# Patient Record
Sex: MALE | Race: WHITE | NOT HISPANIC OR LATINO | Employment: FULL TIME | URBAN - NONMETROPOLITAN AREA
[De-identification: names, ages, dates, MRNs, and addresses within clinical notes are randomized per-mention and may not be internally consistent; named-entity substitution may affect disease eponyms.]

---

## 2017-07-12 ENCOUNTER — OFFICE VISIT (OUTPATIENT)
Dept: CARDIOLOGY | Facility: CLINIC | Age: 62
End: 2017-07-12

## 2017-07-12 VITALS
SYSTOLIC BLOOD PRESSURE: 140 MMHG | DIASTOLIC BLOOD PRESSURE: 89 MMHG | OXYGEN SATURATION: 95 % | HEIGHT: 72 IN | BODY MASS INDEX: 37.82 KG/M2 | WEIGHT: 279.2 LBS | HEART RATE: 78 BPM

## 2017-07-12 DIAGNOSIS — R53.82 CHRONIC FATIGUE: ICD-10-CM

## 2017-07-12 DIAGNOSIS — Z82.49 FAMILY HISTORY OF CORONARY ARTERY DISEASE: ICD-10-CM

## 2017-07-12 DIAGNOSIS — R06.02 SOB (SHORTNESS OF BREATH) ON EXERTION: Primary | ICD-10-CM

## 2017-07-12 DIAGNOSIS — R60.0 LOCALIZED EDEMA: ICD-10-CM

## 2017-07-12 DIAGNOSIS — R06.02 SHORTNESS OF BREATH: ICD-10-CM

## 2017-07-12 PROBLEM — R53.83 FATIGUE: Status: ACTIVE | Noted: 2017-07-12

## 2017-07-12 PROBLEM — R60.9 EDEMA: Status: ACTIVE | Noted: 2017-07-12

## 2017-07-12 PROBLEM — M19.90 ARTHRITIS: Status: ACTIVE | Noted: 2017-07-12

## 2017-07-12 PROCEDURE — 99204 OFFICE O/P NEW MOD 45 MIN: CPT | Performed by: INTERNAL MEDICINE

## 2017-07-12 PROCEDURE — 93000 ELECTROCARDIOGRAM COMPLETE: CPT | Performed by: INTERNAL MEDICINE

## 2017-07-12 RX ORDER — POTASSIUM CHLORIDE 750 MG/1
10 CAPSULE, EXTENDED RELEASE ORAL DAILY
COMMUNITY
Start: 2017-07-05

## 2017-07-12 RX ORDER — FUROSEMIDE 20 MG/1
20 TABLET ORAL DAILY
COMMUNITY
Start: 2017-07-05

## 2017-07-12 RX ORDER — ALPRAZOLAM 0.5 MG/1
0.5 TABLET ORAL 2 TIMES DAILY
COMMUNITY
Start: 2017-07-07

## 2017-07-12 RX ORDER — NAPROXEN 500 MG/1
500 TABLET ORAL 2 TIMES DAILY
COMMUNITY
Start: 2017-07-05

## 2017-07-12 RX ORDER — OMEPRAZOLE 20 MG/1
20 CAPSULE, DELAYED RELEASE ORAL DAILY
COMMUNITY
Start: 2017-07-05

## 2017-07-12 NOTE — PROGRESS NOTES
"Subjective   Baron Bradford is a 62 y.o. male     Chief Complaint   Patient presents with   • Establish Care     patient appears in office today to est cardiac care   • Leg Swelling     patient states he is experiencing increase in BLE edema/swelling       PROBLEM LIST:     1. Family history of CAD    Specialty Problems     None            HPI:  Mr. Baron Bradford is a 62-year-old white male referred by Dr. Farrukh Daly for evaluation of lower extremity edema.    Mr. Bradford first noted swelling in his ankles approximately one year ago.  This seems to have been gradually progressive over time but, on specific questioning, the patient relates that symptoms worsened when he had bilateral knee braces placed.  He is very physically limited by profound degenerative arthritis in both hips and both knees.  He states that all 4 joints need to be replaced.  Mr. 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  He feels it is excised capacity may have been decreased somewhat as he has been less physically active over the summer.    Mr. Bradford denies any type of chest discomfort.  He relates no orthopnea or PND.  He never palpitates, he has not been dizzy, presyncopal, or syncopal.  He has no known liver or renal disease.  He describes no claudication or symptoms of cerebral ischemia.  He has no other symptoms of peripheral arterial disease or of arterial embolic events.  Although Mr. Bradford describes occasional \"panic attacks\", he has never noted palpitations in association with these episodes.          CURRENT MEDICATION:    Current Outpatient Prescriptions   Medication Sig Dispense Refill   • ALPRAZolam (XANAX) 0.5 MG tablet 0.5 mg 2 (Two) Times a Day.     • furosemide (LASIX) 20 MG tablet 20 mg Daily.     • naproxen (NAPROSYN) 500 MG tablet 500 mg 2 (Two) Times a Day.     • omeprazole (priLOSEC) 20 MG capsule 20 mg Daily.     • " potassium chloride (MICRO-K) 10 MEQ CR capsule 10 mEq Daily.       No current facility-administered medications for this visit.        ALLERGIES:    Review of patient's allergies indicates no known allergies.    PAST MEDICAL HISTORY:    Past Medical History:   Diagnosis Date   • Anxiety    • Depression    • Edema    • Osteoarthritis        SURGICAL HISTORY:    Past Surgical History:   Procedure Laterality Date   • COLONOSCOPY W/ BIOPSIES     • ESOPHAGOSCOPY / EGD         SOCIAL HISTORY:    Social History     Social History   • Marital status:      Spouse name: N/A   • Number of children: N/A   • Years of education: N/A     Occupational History   • Not on file.     Social History Main Topics   • Smoking status: Never Smoker   • Smokeless tobacco: Never Used   • Alcohol use No   • Drug use: No   • Sexual activity: Defer     Other Topics Concern   • Not on file     Social History Narrative   • No narrative on file       FAMILY HISTORY:    Family History   Problem Relation Age of Onset   • COPD Mother    • Heart disease Mother    • Hypertension Mother    • Diabetes Mother    • Heart disease Father    • COPD Father    • Hypertension Father    • Diabetes Father        Review of Systems   Constitutional: Positive for fatigue (increase in fatigue).   HENT: Negative.    Eyes: Positive for visual disturbance (wears glasses daily).   Respiratory: Positive for shortness of breath (on exertion). Negative for cough, chest tightness and wheezing.    Cardiovascular: Positive for leg swelling (BLE swelling/edema, worsening since wearing leg braces BL). Negative for chest pain and palpitations.   Gastrointestinal: Positive for constipation (occasionally, not chronic). Negative for abdominal pain, blood in stool (no melena, no hematuria, no hematemesis, rare hematochezia due to constipation ), diarrhea, nausea and vomiting.   Endocrine: Positive for cold intolerance. Negative for heat intolerance, polyphagia and polyuria.  "  Genitourinary: Negative.  Negative for difficulty urinating, frequency and urgency.   Musculoskeletal: Positive for arthralgias (H/O OA, chronic hip and knee pian) and gait problem (ambulates with aid of cane). Negative for back pain, joint swelling, myalgias, neck pain and neck stiffness.   Skin: Negative.  Negative for rash and wound.   Allergic/Immunologic: Positive for environmental allergies (seasonal allergies). Negative for food allergies.   Neurological: Negative for dizziness, tremors, seizures, syncope, facial asymmetry (no stroke like symptoms ), speech difficulty, weakness, light-headedness and headaches.   Hematological: Negative.  Does not bruise/bleed easily.   Psychiatric/Behavioral: Negative for agitation, confusion and sleep disturbance. The patient is nervous/anxious.         Depression          VISIT VITALS:    /89 (BP Location: Left arm, Patient Position: Sitting)  Pulse 78  Ht 72\" (182.9 cm)  Wt 279 lb 3.2 oz (127 kg)  SpO2 95%  BMI 37.87 kg/m2    RECENT LABS:    Objective       Physical Exam   Constitutional: He is oriented to person, place, and time. He appears well-developed and well-nourished. No distress.   HENT:   Head: Normocephalic and atraumatic.   Eyes: Conjunctivae, EOM and lids are normal. Pupils are equal, round, and reactive to light. Lids are everted and swept, no foreign bodies found.   Negative ptosis   Negative lid lag  Negative arcus senilis      Neck: Normal range of motion. Neck supple. Normal carotid pulses, no hepatojugular reflux and no JVD present. Carotid bruit is not present (normal carotid uptrokes ). No thyroid mass and no thyromegaly present.   Cardiovascular: Normal rate, regular rhythm, S1 normal, S2 normal, normal heart sounds, intact distal pulses and normal pulses.   No extrasystoles are present. Exam reveals no gallop, no S3, no S4, no distant heart sounds, no friction rub and no decreased pulses.    No murmur heard.   No systolic murmur is " present    No diastolic murmur is present   Pulmonary/Chest: Effort normal and breath sounds normal. No respiratory distress. He has no decreased breath sounds. He has no wheezes. He has no rhonchi. He has no rales. He exhibits no tenderness.   Abdominal: Soft. Bowel sounds are normal. He exhibits no distension, no abdominal bruit and no mass. There is no tenderness.   Negative organomegaly      Musculoskeletal: He exhibits edema. He exhibits no tenderness or deformity.   Lymphadenopathy:     He has no cervical adenopathy.     He has no axillary adenopathy.   Neurological: He is alert and oriented to person, place, and time. He has normal reflexes.   Skin: Skin is warm and dry. No rash noted. He is not diaphoretic. No erythema. No pallor.   Psychiatric: He has a normal mood and affect. His speech is normal and behavior is normal. Judgment and thought content normal. Cognition and memory are normal.   Nursing note and vitals reviewed.        ECG 12 Lead  Date/Time: 7/12/2017 2:35 PM  Performed by: DEEPA PARRA  Authorized by: DEEPA PARRA   Rhythm: sinus rhythm  Clinical impression: normal ECG  Comments: SOB              Assessment/Plan    #1.  I think that Mr. Yeager lower extremity edema is multifactorial with a significant contributing factor eating is limited lower extremity mobility, and physical constraint from his knee braces.  However, I think that secondary causes need to be excluded.    #2.  Therefore, we will perform a complete met               Diagnosis Plan   1. SOB (shortness of breath) on exertion  ECG 12 Lead    Adult Transthoracic Echo Complete    Comprehensive Metabolic Panel   2. Localized edema  Adult Transthoracic Echo Complete    Comprehensive Metabolic Panel   3. Chronic fatigue  Adult Transthoracic Echo Complete    Comprehensive Metabolic Panel   4. Family history of coronary artery disease  Adult Transthoracic Echo Complete    Comprehensive Metabolic Panel       Return for f/u  after echo and labs.         Shaun Greenwood, Lalybolic panel to assess renal function as well as to look at to the protein and albumin.  We will check thyroid hormones.    #3.  I would like to perform echocardiography to assess LV systolic and diastolic function LV filling pressures and pulmonary pressures.    #4.  The above workup is unremarkable I think would be reasonable to assume that the majority of his lower extremity edema is mechanical and treat accordingly.  If significant pathology has demonstrated any of the above we will pursue that is indicated.    #5.  Mr. Bradford will follow-up with Dr. Daly as instructed, and in our office after testing) a when necessary basis as discussed.  Because of the patient's poor physical capacity, were he to consider joint replacement, I would recommend pharmacologic stress testing preoperatively.

## 2017-07-31 ENCOUNTER — HOSPITAL ENCOUNTER (OUTPATIENT)
Dept: CARDIOLOGY | Facility: HOSPITAL | Age: 62
Discharge: HOME | End: 2017-07-31
Attending: INTERNAL MEDICINE | Admitting: INTERNAL MEDICINE

## 2017-07-31 ENCOUNTER — OUTSIDE FACILITY SERVICE (OUTPATIENT)
Dept: CARDIOLOGY | Facility: CLINIC | Age: 62
End: 2017-07-31

## 2017-07-31 PROCEDURE — 93306 TTE W/DOPPLER COMPLETE: CPT

## 2017-07-31 PROCEDURE — 93306 TTE W/DOPPLER COMPLETE: CPT | Performed by: INTERNAL MEDICINE

## 2017-08-08 ENCOUNTER — DOCUMENTATION (OUTPATIENT)
Dept: CARDIOLOGY | Facility: CLINIC | Age: 62
End: 2017-08-08

## 2017-12-28 ENCOUNTER — OFFICE VISIT (OUTPATIENT)
Dept: CARDIOLOGY | Facility: CLINIC | Age: 62
End: 2017-12-28

## 2017-12-28 VITALS
DIASTOLIC BLOOD PRESSURE: 96 MMHG | HEIGHT: 72 IN | HEART RATE: 88 BPM | BODY MASS INDEX: 39.31 KG/M2 | SYSTOLIC BLOOD PRESSURE: 155 MMHG | WEIGHT: 290.2 LBS | OXYGEN SATURATION: 95 %

## 2017-12-28 DIAGNOSIS — I10 ESSENTIAL HYPERTENSION: ICD-10-CM

## 2017-12-28 DIAGNOSIS — R06.02 SHORTNESS OF BREATH: Primary | ICD-10-CM

## 2017-12-28 DIAGNOSIS — R60.0 BILATERAL LOWER EXTREMITY EDEMA: ICD-10-CM

## 2017-12-28 PROCEDURE — 99213 OFFICE O/P EST LOW 20 MIN: CPT | Performed by: PHYSICIAN ASSISTANT

## 2017-12-28 RX ORDER — AMOXICILLIN 500 MG/1
500 CAPSULE ORAL 3 TIMES DAILY
COMMUNITY
Start: 2017-12-26 | End: 2018-08-06

## 2017-12-28 RX ORDER — ALBUTEROL SULFATE 90 UG/1
2 AEROSOL, METERED RESPIRATORY (INHALATION)
COMMUNITY
Start: 2017-12-26

## 2017-12-28 NOTE — PROGRESS NOTES
Problem list     Subjective   Baron Bradford is a 62 y.o. male     Chief Complaint   Patient presents with   • Results     Presents for 3 month Echo follow up.    • Shortness of Breath       HPI    Patient is a 62-year-old male that presents back to follow-up on echocardiogram.  He was seen initially approximately in July because of lower extremity edema to rule out a cardiac source.  Echocardiogram suggests normal systolic function, mild diastolic dysfunction, no significant valve lesions, no pulmonary hypertension and otherwise unremarkable.    Patient is on diuretics for lower extremity edema.  He complains much today about osteoarthritis in bilateral knees and hips.  Patient is withholding from any surgery at this time.    He does not describe any chest pain or pressure.  Dyspnea is mild at baseline but nothing progressive.  Denies PND orthopnea.  He doesn't palpitate or dysrhythmic symptoms and otherwise is doing well      Outpatient Encounter Prescriptions as of 12/28/2017   Medication Sig Dispense Refill   • ALPRAZolam (XANAX) 0.5 MG tablet 0.5 mg 2 (Two) Times a Day.     • amoxicillin (AMOXIL) 500 MG capsule Take 500 mg by mouth 3 (Three) Times a Day.     • furosemide (LASIX) 20 MG tablet 20 mg Daily.     • naproxen (NAPROSYN) 500 MG tablet 500 mg 2 (Two) Times a Day.     • omeprazole (priLOSEC) 20 MG capsule 20 mg Daily.     • potassium chloride (MICRO-K) 10 MEQ CR capsule 10 mEq Daily.     • VENTOLIN  (90 Base) MCG/ACT inhaler Inhale 2 puffs 4 (Four) Times a Day.       No facility-administered encounter medications on file as of 12/28/2017.        Review of patient's allergies indicates no known allergies.    Past Medical History:   Diagnosis Date   • Anxiety    • Depression    • Edema    • Osteoarthritis        Social History     Social History   • Marital status:      Spouse name: N/A   • Number of children: N/A   • Years of education: N/A     Occupational History   • Not on file.     Social  "History Main Topics   • Smoking status: Never Smoker   • Smokeless tobacco: Never Used   • Alcohol use No   • Drug use: No   • Sexual activity: Defer     Other Topics Concern   • Not on file     Social History Narrative       Family History   Problem Relation Age of Onset   • COPD Mother    • Heart disease Mother    • Hypertension Mother    • Diabetes Mother    • Heart disease Father    • COPD Father    • Hypertension Father    • Diabetes Father        Review of Systems   Constitutional: Positive for fatigue. Negative for chills and fever.   HENT: Positive for congestion (Chest and sinus congestion. ).    Eyes: Positive for visual disturbance (Wears reading glasses).   Respiratory: Positive for cough and shortness of breath (Diagnosed with URI yesterday per PCP.).    Cardiovascular: Negative for chest pain, palpitations and leg swelling.   Gastrointestinal: Negative.    Endocrine: Negative.    Genitourinary: Negative.    Musculoskeletal: Positive for arthralgias (Chronic) and gait problem (Braces to young. knees and ambulates with aid of cane).   Skin: Negative.    Allergic/Immunologic: Negative.    Neurological: Negative for dizziness, syncope, light-headedness, numbness and headaches.   Hematological: Negative.    Psychiatric/Behavioral: Negative.        Objective   Vitals:    12/28/17 0901   BP: 155/96   BP Location: Left arm   Patient Position: Sitting   Pulse: 88   SpO2: 95%   Weight: 132 kg (290 lb 3.2 oz)   Height: 182.9 cm (72\")      /96 (BP Location: Left arm, Patient Position: Sitting)  Pulse 88  Ht 182.9 cm (72\")  Wt 132 kg (290 lb 3.2 oz)  SpO2 95%  BMI 39.36 kg/m2    Lab Results (most recent)     None          Physical Exam   Constitutional: He is oriented to person, place, and time. He appears well-developed and well-nourished. No distress.   HENT:   Head: Normocephalic and atraumatic.   Eyes: EOM are normal. Pupils are equal, round, and reactive to light.   Neck: No JVD present. "   Cardiovascular: Normal rate, regular rhythm, normal heart sounds and intact distal pulses.  Exam reveals no gallop and no friction rub.    No murmur heard.  Pulmonary/Chest: Effort normal and breath sounds normal. No respiratory distress. He has no wheezes. He has no rales. He exhibits no tenderness.   Musculoskeletal: Normal range of motion. He exhibits edema.   Neurological: He is alert and oriented to person, place, and time. No cranial nerve deficit.   Skin: Skin is warm and dry. No rash noted. No erythema. No pallor.   Psychiatric: He has a normal mood and affect. His behavior is normal.   Nursing note and vitals reviewed.      Procedure   Procedures       Assessment/Plan     Problems Addressed this Visit        Cardiovascular and Mediastinum    Essential hypertension       Respiratory    Shortness of breath - Primary       Other    Bilateral lower extremity edema            Recommendation  1.  Cardiac workup was benign and patient has no symptoms to suggest angina failure or dysrhythmia.  2.  Lower extremity edema likely mechanical as well as a degree of venous insufficiency.  We'll continue diuretic therapy  3.  We'll see him back follow-up in 6 months to year.  Follow-up primary as scheduled         Electronically signed by:

## 2018-08-06 ENCOUNTER — OFFICE VISIT (OUTPATIENT)
Dept: CARDIOLOGY | Facility: CLINIC | Age: 63
End: 2018-08-06

## 2018-08-06 VITALS
OXYGEN SATURATION: 95 % | HEIGHT: 72 IN | DIASTOLIC BLOOD PRESSURE: 88 MMHG | WEIGHT: 281 LBS | BODY MASS INDEX: 38.06 KG/M2 | HEART RATE: 69 BPM | SYSTOLIC BLOOD PRESSURE: 143 MMHG

## 2018-08-06 DIAGNOSIS — I10 ESSENTIAL HYPERTENSION: Primary | ICD-10-CM

## 2018-08-06 DIAGNOSIS — Z00.00 HEALTH CARE MAINTENANCE: ICD-10-CM

## 2018-08-06 DIAGNOSIS — R53.82 CHRONIC FATIGUE: ICD-10-CM

## 2018-08-06 DIAGNOSIS — R06.02 SOB (SHORTNESS OF BREATH): ICD-10-CM

## 2018-08-06 PROBLEM — E78.00 PURE HYPERCHOLESTEROLEMIA: Status: ACTIVE | Noted: 2018-08-06

## 2018-08-06 PROCEDURE — 99213 OFFICE O/P EST LOW 20 MIN: CPT | Performed by: PHYSICIAN ASSISTANT

## 2018-08-06 PROCEDURE — 93000 ELECTROCARDIOGRAM COMPLETE: CPT | Performed by: PHYSICIAN ASSISTANT

## 2018-08-06 NOTE — PROGRESS NOTES
Problem list     Subjective   Baron Bradford is a 63 y.o. male     Chief Complaint   Patient presents with   • Follow-up     here for 6 month f/u   • Shortness of Breath   • Hypertension       HPI    Patient is a 63-year-old male that presents back for follow-up.  He has been doing well.  He has no chest pain or pressure.  He will have shortness of breath he chest exerts for high levels but cannot do that much anymore because of his lower knee discomfort.  Any surgery.  He has no PND orthopnea.  He doesn't palpitate or have dysrhythmic symptoms.  He has mild fatigue but otherwise is doing well    Outpatient Encounter Prescriptions as of 8/6/2018   Medication Sig Dispense Refill   • ALPRAZolam (XANAX) 0.5 MG tablet 0.5 mg 2 (Two) Times a Day.     • furosemide (LASIX) 20 MG tablet 20 mg Daily.     • naproxen (NAPROSYN) 500 MG tablet 500 mg 2 (Two) Times a Day.     • omeprazole (priLOSEC) 20 MG capsule 20 mg Daily.     • potassium chloride (MICRO-K) 10 MEQ CR capsule 10 mEq Daily.     • VENTOLIN  (90 Base) MCG/ACT inhaler Inhale 2 puffs 4 (Four) Times a Day.     • [DISCONTINUED] amoxicillin (AMOXIL) 500 MG capsule Take 500 mg by mouth 3 (Three) Times a Day.       No facility-administered encounter medications on file as of 8/6/2018.        Patient has no known allergies.    Past Medical History:   Diagnosis Date   • Anxiety    • Depression    • Edema    • Osteoarthritis        Social History     Social History   • Marital status:      Spouse name: N/A   • Number of children: N/A   • Years of education: N/A     Occupational History   • Not on file.     Social History Main Topics   • Smoking status: Never Smoker   • Smokeless tobacco: Never Used   • Alcohol use No   • Drug use: No   • Sexual activity: Defer     Other Topics Concern   • Not on file     Social History Narrative   • No narrative on file       Family History   Problem Relation Age of Onset   • COPD Mother    • Heart disease Mother    • Hypertension  "Mother    • Diabetes Mother    • Heart disease Father    • COPD Father    • Hypertension Father    • Diabetes Father        Review of Systems   Constitutional: Positive for fatigue.   HENT: Negative.    Eyes: Positive for visual disturbance (reading glasses).   Respiratory: Positive for shortness of breath (easily soa with activity).    Cardiovascular: Positive for leg swelling. Negative for chest pain and palpitations.   Gastrointestinal: Negative.    Endocrine: Negative.    Genitourinary: Negative.    Musculoskeletal: Positive for arthralgias, back pain, gait problem (ambulates with a cane - needing hip and knee replacement), myalgias and neck pain.   Skin: Negative.    Allergic/Immunologic: Negative.    Neurological: Negative for dizziness, syncope, weakness, light-headedness, numbness and headaches.   Hematological: Bruises/bleeds easily (bleed).   Psychiatric/Behavioral: Positive for sleep disturbance ( insomnia). The patient is nervous/anxious.    All other systems reviewed and are negative.      Objective   Vitals:    08/06/18 1555   BP: 143/88   BP Location: Left arm   Patient Position: Sitting   Pulse: 69   SpO2: 95%   Weight: 127 kg (281 lb)   Height: 182.9 cm (72\")      /88 (BP Location: Left arm, Patient Position: Sitting)   Pulse 69   Ht 182.9 cm (72\")   Wt 127 kg (281 lb)   SpO2 95%   BMI 38.11 kg/m²     Lab Results (most recent)     None          Physical Exam   Constitutional: He is oriented to person, place, and time. He appears well-developed and well-nourished. No distress.   HENT:   Head: Normocephalic and atraumatic.   Eyes: Pupils are equal, round, and reactive to light. EOM are normal.   Neck: No JVD present.   Cardiovascular: Normal rate, regular rhythm, normal heart sounds and intact distal pulses.  Exam reveals no gallop and no friction rub.    No murmur heard.  Pulmonary/Chest: Effort normal and breath sounds normal. No respiratory distress. He has no wheezes. He has no rales. " He exhibits no tenderness.   Abdominal: He exhibits no distension.   Musculoskeletal: Normal range of motion. He exhibits no edema.   Neurological: He is alert and oriented to person, place, and time. No cranial nerve deficit.   Skin: Skin is warm and dry. No rash noted. No erythema. No pallor.   Psychiatric: He has a normal mood and affect. His behavior is normal.   Nursing note and vitals reviewed.      Procedure     ECG 12 Lead  Date/Time: 8/6/2018 4:02 PM  Performed by: ZORAN HIGGINS  Authorized by: ZORAN HIGGINS   Comments: EKG demonstrates sinus rhythm at 69 bpm with no acute ST changes               Assessment/Plan     Problems Addressed this Visit        Cardiovascular and Mediastinum    Essential hypertension - Primary    Relevant Orders    ECG 12 Lead       Respiratory    SOB (shortness of breath)    Relevant Orders    ECG 12 Lead       Other    Fatigue      Other Visit Diagnoses     Health care maintenance        Relevant Orders    ECG 12 Lead            Recommendation  1.  Patient doing well.  No symptoms of angina, failure, arrhythmia.  Shortness of breath is mild.  Blood pressure is upper limits of normal at this time.  Patient has mild fatigue.  For now, we will continue medical regimen.  We'll see him in follow-up in 6 months.  Follow-up primary as scheduled              Patient's Body mass index is 38.11 kg/m². BMI is above normal parameters. Recommendations include: educational material.       Electronically signed by:

## 2019-02-05 ENCOUNTER — OFFICE VISIT (OUTPATIENT)
Dept: CARDIOLOGY | Facility: CLINIC | Age: 64
End: 2019-02-05

## 2019-02-05 VITALS
HEART RATE: 82 BPM | HEIGHT: 72 IN | DIASTOLIC BLOOD PRESSURE: 95 MMHG | BODY MASS INDEX: 38.14 KG/M2 | SYSTOLIC BLOOD PRESSURE: 145 MMHG | OXYGEN SATURATION: 94 % | WEIGHT: 281.6 LBS

## 2019-02-05 DIAGNOSIS — I10 ESSENTIAL HYPERTENSION: ICD-10-CM

## 2019-02-05 DIAGNOSIS — R60.0 BILATERAL LOWER EXTREMITY EDEMA: ICD-10-CM

## 2019-02-05 DIAGNOSIS — R06.02 SHORTNESS OF BREATH: Primary | ICD-10-CM

## 2019-02-05 PROCEDURE — 99213 OFFICE O/P EST LOW 20 MIN: CPT | Performed by: PHYSICIAN ASSISTANT

## 2019-02-05 NOTE — PROGRESS NOTES
Problem list     Subjective   Baron Bradford is a 64 y.o. male     Chief Complaint   Patient presents with   • Follow-up     presents for 6 month f/u   • Hypertension   • Shortness of Breath       HPI    Patient is a 64-year-old male that presents back to the office for follow-up.  We'll follow the patient routinely for cardiovascular risk factor modification.  He had echocardiogram in 2016 demonstrated normal systolic function with no critical file lesion or effusion.    He is feeling well.  He has no chest pain or pressure.  He has moderate levels of stable dyspnea.  This is chronic without progression.  No PND orthopnea.    He doesn't palpitate or dysrhythmic symptoms.  He has lower extremity edema controlled on Lasix therapy.  Patient is considering bilateral hip and knee replacements he has a lot of osteoarthritic pain but otherwise is doing well      Outpatient Encounter Medications as of 2/5/2019   Medication Sig Dispense Refill   • ALPRAZolam (XANAX) 0.5 MG tablet 0.5 mg 2 (Two) Times a Day.     • furosemide (LASIX) 20 MG tablet 20 mg Daily.     • naproxen (NAPROSYN) 500 MG tablet 500 mg 2 (Two) Times a Day.     • omeprazole (priLOSEC) 20 MG capsule 20 mg Daily.     • potassium chloride (MICRO-K) 10 MEQ CR capsule 10 mEq Daily.     • VENTOLIN  (90 Base) MCG/ACT inhaler Inhale 2 puffs 4 (Four) Times a Day.       No facility-administered encounter medications on file as of 2/5/2019.        Patient has no known allergies.    Past Medical History:   Diagnosis Date   • Anxiety    • Depression    • Edema    • Osteoarthritis        Social History     Socioeconomic History   • Marital status:      Spouse name: Not on file   • Number of children: Not on file   • Years of education: Not on file   • Highest education level: Not on file   Social Needs   • Financial resource strain: Not on file   • Food insecurity - worry: Not on file   • Food insecurity - inability: Not on file   • Transportation needs -  "medical: Not on file   • Transportation needs - non-medical: Not on file   Occupational History   • Not on file   Tobacco Use   • Smoking status: Never Smoker   • Smokeless tobacco: Never Used   Substance and Sexual Activity   • Alcohol use: No   • Drug use: No   • Sexual activity: Defer   Other Topics Concern   • Not on file   Social History Narrative   • Not on file       Family History   Problem Relation Age of Onset   • COPD Mother    • Heart disease Mother    • Hypertension Mother    • Diabetes Mother    • Heart disease Father    • COPD Father    • Hypertension Father    • Diabetes Father        Review of Systems   Constitutional: Positive for fatigue.   HENT: Negative.    Eyes: Positive for visual disturbance. Eye redness: reading glasses.   Respiratory: Positive for shortness of breath (on exertion).    Cardiovascular: Positive for leg swelling. Negative for chest pain and palpitations.   Gastrointestinal: Positive for constipation.   Endocrine: Negative.    Genitourinary: Negative.    Musculoskeletal: Positive for arthralgias, back pain, gait problem (uses a cane), myalgias and neck pain.   Skin: Negative.    Allergic/Immunologic: Negative.    Neurological: Negative for dizziness, syncope, weakness, light-headedness, numbness and headaches.   Hematological: Bruises/bleeds easily.   Psychiatric/Behavioral: Positive for agitation and sleep disturbance ( wakes up at times smothering/soa ). The patient is nervous/anxious.    All other systems reviewed and are negative.      Objective   Vitals:    02/05/19 1530   BP: 145/95   BP Location: Left arm   Patient Position: Sitting   Pulse: 82   SpO2: 94%   Weight: 128 kg (281 lb 9.6 oz)   Height: 182.9 cm (72.01\")      /95 (BP Location: Left arm, Patient Position: Sitting)   Pulse 82   Ht 182.9 cm (72.01\")   Wt 128 kg (281 lb 9.6 oz)   SpO2 94%   BMI 38.18 kg/m²     Lab Results (most recent)     None          Physical Exam   Constitutional: He is oriented to " person, place, and time. He appears well-developed and well-nourished. No distress.   HENT:   Head: Normocephalic and atraumatic.   Eyes: EOM are normal. Pupils are equal, round, and reactive to light.   Neck: No JVD present.   Cardiovascular: Normal rate, regular rhythm, normal heart sounds and intact distal pulses. Exam reveals no gallop and no friction rub.   No murmur heard.  Pulmonary/Chest: Effort normal and breath sounds normal. No respiratory distress. He has no wheezes. He has no rales. He exhibits no tenderness.   Musculoskeletal: Normal range of motion. He exhibits no edema.   Neurological: He is alert and oriented to person, place, and time. No cranial nerve deficit.   Skin: Skin is warm and dry. No rash noted. No erythema. No pallor.   Psychiatric: He has a normal mood and affect. His behavior is normal.   Nursing note and vitals reviewed.      Procedure   Procedures       Assessment/Plan     Problems Addressed this Visit        Cardiovascular and Mediastinum    Essential hypertension       Other    Bilateral lower extremity edema      Other Visit Diagnoses     Shortness of breath    -  Primary            Recommendation  1.  Patient doing well.  No symptoms of angina, failure, or arrhythmia.  2.  Patient on appropriate medication.  Lasix is controlling edema.  Blood pressure is controlled.  Dyspnea has remained stable.  For now, we will continue current therapy and see him back for follow-up in 6 months to year.  Follow-up primary as scheduled              Patient's Body mass index is 38.18 kg/m². BMI is above normal parameters. Recommendations include: educational material.       Electronically signed by:

## 2019-02-05 NOTE — PATIENT INSTRUCTIONS
Heart-Healthy Eating Plan  Many factors influence your heart health, including eating and exercise habits. Heart (coronary) risk increases with abnormal blood fat (lipid) levels. Heart-healthy meal planning includes limiting unhealthy fats, increasing healthy fats, and making other small dietary changes. This includes maintaining a healthy body weight to help keep lipid levels within a normal range.  What is my plan?  Your health care provider recommends that you:  · Get no more than _________% of the total calories in your daily diet from fat.  · Limit your intake of saturated fat to less than _________% of your total calories each day.  · Limit the amount of cholesterol in your diet to less than _________ mg per day.    What types of fat should I choose?  · Choose healthy fats more often. Choose monounsaturated and polyunsaturated fats, such as olive oil and canola oil, flaxseeds, walnuts, almonds, and seeds.  · Eat more omega-3 fats. Good choices include salmon, mackerel, sardines, tuna, flaxseed oil, and ground flaxseeds. Aim to eat fish at least two times each week.  · Limit saturated fats. Saturated fats are primarily found in animal products, such as meats, butter, and cream. Plant sources of saturated fats include palm oil, palm kernel oil, and coconut oil.  · Avoid foods with partially hydrogenated oils in them. These contain trans fats. Examples of foods that contain trans fats are stick margarine, some tub margarines, cookies, crackers, and other baked goods.  What general guidelines do I need to follow?  · Check food labels carefully to identify foods with trans fats or high amounts of saturated fat.  · Fill one half of your plate with vegetables and green salads. Eat 4-5 servings of vegetables per day. A serving of vegetables equals 1 cup of raw leafy vegetables, ½ cup of raw or cooked cut-up vegetables, or ½ cup of vegetable juice.  · Fill one fourth of your plate with whole grains. Look for the word  "\"whole\" as the first word in the ingredient list.  · Fill one fourth of your plate with lean protein foods.  · Eat 4-5 servings of fruit per day. A serving of fruit equals one medium whole fruit, ¼ cup of dried fruit, ½ cup of fresh, frozen, or canned fruit, or ½ cup of 100% fruit juice.  · Eat more foods that contain soluble fiber. Examples of foods that contain this type of fiber are apples, broccoli, carrots, beans, peas, and barley. Aim to get 20-30 g of fiber per day.  · Eat more home-cooked food and less restaurant, buffet, and fast food.  · Limit or avoid alcohol.  · Limit foods that are high in starch and sugar.  · Avoid fried foods.  · Cook foods by using methods other than frying. Baking, boiling, grilling, and broiling are all great options. Other fat-reducing suggestions include:  ? Removing the skin from poultry.  ? Removing all visible fats from meats.  ? Skimming the fat off of stews, soups, and gravies before serving them.  ? Steaming vegetables in water or broth.  · Lose weight if you are overweight. Losing just 5-10% of your initial body weight can help your overall health and prevent diseases such as diabetes and heart disease.  · Increase your consumption of nuts, legumes, and seeds to 4-5 servings per week. One serving of dried beans or legumes equals ½ cup after being cooked, one serving of nuts equals 1½ ounces, and one serving of seeds equals ½ ounce or 1 tablespoon.  · You may need to monitor your salt (sodium) intake, especially if you have high blood pressure. Talk with your health care provider or dietitian to get more information about reducing sodium.  What foods can I eat?  Grains    Breads, including Maldivian, white, sandra, wheat, raisin, rye, oatmeal, and Italian. Tortillas that are neither fried nor made with lard or trans fat. Low-fat rolls, including hotdog and hamburger buns and English muffins. Biscuits. Muffins. Waffles. Pancakes. Light popcorn. Whole-grain cereals. Flatbread. " Meredith toast. Pretzels. Breadsticks. Rusks. Low-fat snacks and crackers, including oyster, saltine, matzo, yolanda, animal, and rye. Rice and pasta, including brown rice and those that are made with whole wheat.  Vegetables  All vegetables.  Fruits  All fruits, but limit coconut.  Meats and Other Protein Sources  Lean, well-trimmed beef, veal, pork, and lamb. Chicken and turkey without skin. All fish and shellfish. Wild duck, rabbit, pheasant, and venison. Egg whites or low-cholesterol egg substitutes. Dried beans, peas, lentils, and tofu. Seeds and most nuts.  Dairy  Low-fat or nonfat cheeses, including ricotta, string, and mozzarella. Skim or 1% milk that is liquid, powdered, or evaporated. Buttermilk that is made with low-fat milk. Nonfat or low-fat yogurt.  Beverages  Mineral water. Diet carbonated beverages.  Sweets and Desserts  Sherbets and fruit ices. Honey, jam, marmalade, jelly, and syrups. Meringues and gelatins. Pure sugar candy, such as hard candy, jelly beans, gumdrops, mints, marshmallows, and small amounts of dark chocolate. Jose food cake.  Eat all sweets and desserts in moderation.  Fats and Oils  Nonhydrogenated (trans-free) margarines. Vegetable oils, including soybean, sesame, sunflower, olive, peanut, safflower, corn, canola, and cottonseed. Salad dressings or mayonnaise that are made with a vegetable oil. Limit added fats and oils that you use for cooking, baking, salads, and as spreads.  Other  Cocoa powder. Coffee and tea. All seasonings and condiments.  The items listed above may not be a complete list of recommended foods or beverages. Contact your dietitian for more options.  What foods are not recommended?  Grains  Breads that are made with saturated or trans fats, oils, or whole milk. Croissants. Butter rolls. Cheese breads. Sweet rolls. Donuts. Buttered popcorn. Chow mein noodles. High-fat crackers, such as cheese or butter crackers.  Meats and Other Protein Sources  Fatty meats, such  as hotdogs, short ribs, sausage, spareribs, guy, ribeye roast or steak, and mutton. High-fat deli meats, such as salami and bologna. Caviar. Domestic duck and goose. Organ meats, such as kidney, liver, sweetbreads, brains, gizzard, chitterlings, and heart.  Dairy  Cream, sour cream, cream cheese, and creamed cottage cheese. Whole milk cheeses, including blue (carina), Antonito Ari, Brie, Wellington, American, Havarti, Swiss, cheddar, Camembert, and [place]. Whole or 2% milk that is liquid, evaporated, or condensed. Whole buttermilk. Cream sauce or high-fat cheese sauce. Yogurt that is made from whole milk.  Beverages  Regular sodas and drinks with added sugar.  Sweets and Desserts  Frosting. Pudding. Cookies. Cakes other than zafar food cake. Candy that has milk chocolate or white chocolate, hydrogenated fat, butter, coconut, or unknown ingredients. Buttered syrups. Full-fat ice cream or ice cream drinks.  Fats and Oils  Gravy that has suet, meat fat, or shortening. Cocoa butter, hydrogenated oils, palm oil, coconut oil, palm kernel oil. These can often be found in baked products, candy, fried foods, nondairy creamers, and whipped toppings. Solid fats and shortenings, including guy fat, salt pork, lard, and butter. Nondairy cream substitutes, such as coffee creamers and sour cream substitutes. Salad dressings that are made of unknown oils, cheese, or sour cream.  The items listed above may not be a complete list of foods and beverages to avoid. Contact your dietitian for more information.  This information is not intended to replace advice given to you by your health care provider. Make sure you discuss any questions you have with your health care provider.  Document Released: 09/26/2009 Document Revised: 07/07/2017 Document Reviewed: 06/11/2015  [employer] Interactive Patient Education © 2018 [employer]

## 2019-08-06 ENCOUNTER — OFFICE VISIT (OUTPATIENT)
Dept: CARDIOLOGY | Facility: CLINIC | Age: 64
End: 2019-08-06

## 2019-08-06 VITALS
OXYGEN SATURATION: 93 % | HEIGHT: 72 IN | DIASTOLIC BLOOD PRESSURE: 76 MMHG | SYSTOLIC BLOOD PRESSURE: 139 MMHG | WEIGHT: 295 LBS | BODY MASS INDEX: 39.96 KG/M2 | HEART RATE: 77 BPM

## 2019-08-06 DIAGNOSIS — R06.02 SHORTNESS OF BREATH: Primary | ICD-10-CM

## 2019-08-06 DIAGNOSIS — I10 ESSENTIAL HYPERTENSION: ICD-10-CM

## 2019-08-06 DIAGNOSIS — R60.0 LOWER EXTREMITY EDEMA: ICD-10-CM

## 2019-08-06 PROCEDURE — 93000 ELECTROCARDIOGRAM COMPLETE: CPT | Performed by: PHYSICIAN ASSISTANT

## 2019-08-06 PROCEDURE — 99213 OFFICE O/P EST LOW 20 MIN: CPT | Performed by: PHYSICIAN ASSISTANT

## 2019-08-06 RX ORDER — MELOXICAM 15 MG/1
15 TABLET ORAL DAILY
Qty: 30 TABLET | Refills: 11 | Status: SHIPPED | OUTPATIENT
Start: 2019-08-06 | End: 2020-07-28

## 2019-08-06 NOTE — PATIENT INSTRUCTIONS
"Fat and Cholesterol Restricted Eating Plan  Getting too much fat and cholesterol in your diet may cause health problems. Choosing the right foods helps keep your fat and cholesterol at normal levels. This can keep you from getting certain diseases.  Your doctor may recommend an eating plan that includes:  · Total fat: ______% or less of total calories a day.  · Saturated fat: ______% or less of total calories a day.  · Cholesterol: less than _________mg a day.  · Fiber: ______g a day.  What are tips for following this plan?  General tips    · Work with your doctor to lose weight if you need to.  · Avoid:  ? Foods with added sugar.  ? Fried foods.  ? Foods with partially hydrogenated oils.  · Limit alcohol intake to no more than 1 drink a day for nonpregnant women and 2 drinks a day for men. One drink equals 12 oz of beer, 5 oz of wine, or 1½ oz of hard liquor.  Reading food labels  · Check food labels for:  ? Trans fats.  ? Partially hydrogenated oils.  ? Saturated fat (g) in each serving.  ? Cholesterol (mg) in each serving.  ? Fiber (g) in each serving.  · Choose foods with healthy fats, such as:  ? Monounsaturated fats.  ? Polyunsaturated fats.  ? Omega-3 fats.  · Choose grain products that have whole grains. Look for the word \"whole\" as the first word in the ingredient list.  Cooking  · Cook foods using low-fat methods. These include baking, boiling, grilling, and broiling.  · Eat more home-cooked foods. Eat at restaurants and buffets less often.  · Avoid cooking using saturated fats, such as butter, cream, palm oil, palm kernel oil, and coconut oil.  Meal planning    · At meals, divide your plate into four equal parts:  ? Fill one-half of your plate with vegetables and green salads.  ? Fill one-fourth of your plate with whole grains.  ? Fill one-fourth of your plate with low-fat (lean) protein foods.  · Eat fish that is high in omega-3 fats at least two times a week. This includes mackerel, tuna, sardines, and " salmon.  · Eat foods that are high in fiber, such as whole grains, beans, apples, broccoli, carrots, peas, and barley.  Recommended foods  Grains  · Whole grains, such as whole wheat or whole grain breads, crackers, cereals, and pasta. Unsweetened oatmeal, bulgur, barley, quinoa, or brown rice. Corn or whole wheat flour tortillas.  Vegetables  · Fresh or frozen vegetables (raw, steamed, roasted, or grilled). Green salads.  Fruits  · All fresh, canned (in natural juice), or frozen fruits.  Meats and other protein foods  · Ground beef (85% or leaner), grass-fed beef, or beef trimmed of fat. Skinless chicken or turkey. Ground chicken or turkey. Pork trimmed of fat. All fish and seafood. Egg whites. Dried beans, peas, or lentils. Unsalted nuts or seeds. Unsalted canned beans. Nut butters without added sugar or oil.  Dairy  · Low-fat or nonfat dairy products, such as skim or 1% milk, 2% or reduced-fat cheeses, low-fat and fat-free ricotta or cottage cheese, or plain low-fat and nonfat yogurt.  Fats and oils  · Tub margarine without trans fats. Light or reduced-fat mayonnaise and salad dressings. Avocado. Olive, canola, sesame, or safflower oils.  The items listed above may not be a complete list of recommended foods or beverages. Contact your dietitian for more options.  The items listed above may not be a complete list of foods and beverages [you/your child] can eat. Contact a dietitian for more information.  Foods to avoid  Grains  · White bread. White pasta. White rice. Cornbread. Bagels, pastries, and croissants. Crackers and snack foods that contain trans fat and hydrogenated oils.  Vegetables  · Vegetables cooked in cheese, cream, or butter sauce. Fried vegetables.  Fruits  · Canned fruit in heavy syrup. Fruit in cream or butter sauce. Fried fruit.  Meats and other protein foods  · Fatty cuts of meat. Ribs, chicken wings, guy, sausage, bologna, salami, chitterlings, fatback, hot dogs, bratwurst, and packaged  lunch meats. Liver and organ meats. Whole eggs and egg yolks. Chicken and turkey with skin. Fried meat.  Dairy  · Whole or 2% milk, cream, half-and-half, and cream cheese. Whole milk cheeses. Whole-fat or sweetened yogurt. Full-fat cheeses. Nondairy creamers and whipped toppings. Processed cheese, cheese spreads, and cheese curds.  Beverages  · Alcohol. Sugar-sweetened drinks such as sodas, lemonade, and fruit drinks.  Fats and oils  · Butter, stick margarine, lard, shortening, ghee, or guy fat. Coconut, palm kernel, and palm oils.  Sweets and desserts  · Corn syrup, sugars, honey, and molasses. Candy. Jam and jelly. Syrup. Sweetened cereals. Cookies, pies, cakes, donuts, muffins, and ice cream.  The items listed above may not be a complete list of foods and beverages to avoid. Contact your dietitian for more information.  The items listed above may not be a complete list of foods and beverages [you/your child] should avoid. Contact a dietitian for more information.  Summary  · Choosing the right foods helps keep your fat and cholesterol at normal levels. This can keep you from getting certain diseases.  · At meals, fill one-half of your plate with vegetables and green salads.  · Eat high-fiber foods, like whole grains, beans, apples, carrots, peas, and barley.  · Limit added sugar, saturated fats, alcohol, and fried foods.  This information is not intended to replace advice given to you by your health care provider. Make sure you discuss any questions you have with your health care provider.  Document Released: 06/18/2013 Document Revised: 09/04/2018 Document Reviewed: 09/04/2018  [employer] Interactive Patient Education © 2019 [employer]  BMI for Adults    Body mass index (BMI) is a number that is calculated from a person's weight and height. BMI may help to estimate how much of a person's weight is composed of fat. BMI can help identify those who may be at higher risk for certain medical problems.  How is BMI  "used with adults?  BMI is used as a screening tool to identify possible weight problems. It is used to check whether a person is obese, overweight, healthy weight, or underweight.  How is BMI calculated?  BMI measures your weight and compares it to your height. This can be done either in English ([place] or metric measurements. Note that charts are available to help you find your BMI quickly and easily without having to do these calculations yourself.  To calculate your BMI in English ([place] measurements, your health care provider will:  1. Measure your weight in pounds (lb).  2. Multiply the number of pounds by 703.  ? For example, for a person who weighs 180 lb, multiply that number by 703, which equals 126,540.  3. Measure your height in inches (in). Then multiply that number by itself to get a measurement called \"inches squared.\"  ? For example, for a person who is 70 in tall, the \"inches squared\" measurement is 70 in x 70 in, which equals 4900 inches squared.  4. Divide the total from Step 2 (number of lb x 703) by the total from Step 3 (inches squared): 126,540 ÷ 4900 = 25.8. This is your BMI.  To calculate your BMI in metric measurements, your health care provider will:  1. Measure your weight in kilograms (kg).  2. Measure your height in meters (m). Then multiply that number by itself to get a measurement called \"meters squared.\"  ? For example, for a person who is 1.75 m tall, the \"meters squared\" measurement is 1.75 m x 1.75 m, which is equal to 3.1 meters squared.  3. Divide the number of kilograms (your weight) by the meters squared number. In this example: 70 ÷ 3.1 = 22.6. This is your BMI.  How is BMI interpreted?  To interpret your results, your health care provider will use BMI charts to identify whether you are underweight, normal weight, overweight, or obese. The following guidelines will be used:  · Underweight: BMI less than 18.5.  · Normal weight: BMI between 18.5 and 24.9.  · Overweight: BMI " between 25 and 29.9.  · Obese: BMI of 30 and above.  Please note:  · Weight includes both fat and muscle, so someone with a muscular build, such as an athlete, may have a BMI that is higher than 24.9. In cases like these, BMI is not an accurate measure of body fat.  · To determine if excess body fat is the cause of a BMI of 25 or higher, further assessments may need to be done by a health care provider.  · BMI is usually interpreted in the same way for men and women.  Why is BMI a useful tool?  BMI is useful in two ways:  · Identifying a weight problem that may be related to a medical condition, or that may increase the risk for medical problems.  · Promoting lifestyle and diet changes in order to reach a healthy weight.  Summary  · Body mass index (BMI) is a number that is calculated from a person's weight and height.  · BMI may help to estimate how much of a person's weight is composed of fat. BMI can help identify those who may be at higher risk for certain medical problems.  · BMI can be measured using English measurements or metric measurements.  · To interpret your results, your health care provider will use BMI charts to identify whether you are underweight, normal weight, overweight, or obese.  This information is not intended to replace advice given to you by your health care provider. Make sure you discuss any questions you have with your health care provider.  Document Released: 08/29/2005 Document Revised: 10/31/2018 Document Reviewed: 10/31/2018  [employer] Interactive Patient Education © 2019 [employer]

## 2019-08-06 NOTE — PROGRESS NOTES
Problem list     Subjective   Baron Bradford is a 64 y.o. male     Chief Complaint   Patient presents with   • Hypertension   • Shortness of Breath       HPI    Patient is a 64-year-old male who presents back to the office for follow-up.  He is doing well.  He has no chest pain or pressure.  He has mild stable levels of dyspnea.  No progressive shortness of breath.  No PND orthopnea.    Rhythmic symptoms.  He does complain of bilateral knee and hip pain and is considering knee replacements.  Otherwise he is doing well      Outpatient Encounter Medications as of 8/6/2019   Medication Sig Dispense Refill   • ALPRAZolam (XANAX) 0.5 MG tablet 0.5 mg 2 (Two) Times a Day.     • furosemide (LASIX) 20 MG tablet 20 mg Daily.     • naproxen (NAPROSYN) 500 MG tablet 500 mg 2 (Two) Times a Day.     • omeprazole (priLOSEC) 20 MG capsule 20 mg Daily.     • potassium chloride (MICRO-K) 10 MEQ CR capsule 10 mEq Daily.     • VENTOLIN  (90 Base) MCG/ACT inhaler Inhale 2 puffs 4 (Four) Times a Day.     • meloxicam (MOBIC) 15 MG tablet Take 1 tablet by mouth Daily. 30 tablet 11     No facility-administered encounter medications on file as of 8/6/2019.        Patient has no known allergies.    Past Medical History:   Diagnosis Date   • Anxiety    • Depression    • Edema    • Osteoarthritis        Social History     Socioeconomic History   • Marital status:      Spouse name: Not on file   • Number of children: Not on file   • Years of education: Not on file   • Highest education level: Not on file   Tobacco Use   • Smoking status: Never Smoker   • Smokeless tobacco: Never Used   Substance and Sexual Activity   • Alcohol use: No   • Drug use: No   • Sexual activity: Defer       Family History   Problem Relation Age of Onset   • COPD Mother    • Heart disease Mother    • Hypertension Mother    • Diabetes Mother    • Heart disease Father    • COPD Father    • Hypertension Father    • Diabetes Father        Review of Systems  "  Constitutional: Positive for fatigue (some increased fatigue ). Negative for chills and fever.   HENT: Negative.    Eyes: Positive for visual disturbance (reading glasses ).   Respiratory: Positive for shortness of breath (SOA with exertion ). Negative for chest tightness.    Cardiovascular: Positive for leg swelling (LE edema which doesn't always resolve overnight ). Negative for chest pain and palpitations.   Gastrointestinal: Negative.    Endocrine: Positive for heat intolerance (always hot ). Negative for cold intolerance.   Genitourinary: Negative.    Musculoskeletal: Positive for arthralgias (Both hips and knees ) and gait problem (Uses a quad cane in each hand to assist with ambulation ). Negative for back pain.   Skin: Negative.  Negative for rash and wound.   Allergic/Immunologic: Negative.  Negative for environmental allergies and food allergies.   Neurological: Positive for light-headedness (One episode of feeling lightheadedness. States he was sitting in a chair doing dishes when it happened ). Negative for dizziness and weakness.   Hematological: Bruises/bleeds easily (Bruises and bleeds easily ).   Psychiatric/Behavioral: Negative.  Negative for sleep disturbance (Denies waking with smothering or SOA).   All other systems reviewed and are negative.      Objective   Vitals:    08/06/19 1545   BP: 139/76   BP Location: Left arm   Patient Position: Sitting   Pulse: 77   SpO2: 93%   Weight: 134 kg (295 lb)   Height: 182.9 cm (72\")      /76 (BP Location: Left arm, Patient Position: Sitting)   Pulse 77   Ht 182.9 cm (72\")   Wt 134 kg (295 lb)   SpO2 93%   BMI 40.01 kg/m²     Lab Results (most recent)     None          Physical Exam   Constitutional: He is oriented to person, place, and time. He appears well-developed and well-nourished. No distress.   HENT:   Head: Normocephalic and atraumatic.   Eyes: EOM are normal. Pupils are equal, round, and reactive to light.   Neck: No JVD present. "   Cardiovascular: Normal rate, regular rhythm, normal heart sounds and intact distal pulses. Exam reveals no gallop and no friction rub.   No murmur heard.  Pulmonary/Chest: Effort normal and breath sounds normal. No respiratory distress. He has no wheezes. He has no rales. He exhibits no tenderness.   Abdominal: He exhibits no distension.   Musculoskeletal: Normal range of motion. He exhibits no edema.   Neurological: He is alert and oriented to person, place, and time. No cranial nerve deficit.   Skin: Skin is warm and dry. No rash noted. No erythema. No pallor.   Psychiatric: He has a normal mood and affect. His behavior is normal.   Nursing note and vitals reviewed.      Procedure     ECG 12 Lead  Date/Time: 8/6/2019 4:03 PM  Performed by: David Candelaria PA  Authorized by: David Candelaria PA   Comparison: not compared with previous ECG   Comments: SOA    EKG demonstrates sinus rhythm 75 bpm with no acute ST changes               Assessment/Plan     Problems Addressed this Visit        Cardiovascular and Mediastinum    Essential hypertension       Respiratory    Shortness of breath - Primary    Relevant Orders    ECG 12 Lead       Other    Lower extremity edema          Recommendation  1.  Patient with chronic dyspnea but mild.  Blood pressure is controlled and lower extremity edema controlled on Lasix.  No ischemic symptoms and at this point we will continue current medical therapy.  We will see patient back for follow-up in 6 months to year.  Follow-up with primary as scheduled           Patient's Body mass index is 40.01 kg/m². BMI is above normal parameters. Recommendations include: educational material and referral to primary care.       Electronically signed by:

## 2020-07-28 RX ORDER — MELOXICAM 15 MG/1
TABLET ORAL
Qty: 30 TABLET | Refills: 0 | Status: SHIPPED | OUTPATIENT
Start: 2020-07-28

## 2020-09-11 RX ORDER — MELOXICAM 15 MG/1
TABLET ORAL
Qty: 30 TABLET | Refills: 0 | OUTPATIENT
Start: 2020-09-11

## 2023-05-23 ENCOUNTER — TELEPHONE (OUTPATIENT)
Dept: CARDIOLOGY | Facility: CLINIC | Age: 68
End: 2023-05-23

## 2023-05-23 NOTE — TELEPHONE ENCOUNTER
Caller: NILSA MEZA    Relationship to patient: Spouse    Best call back number: OR CALL PT [phone]    Patient is needing: PT IS HAVING A KNEE REPLACEMENT ON 6.13.23 - THE ANESTHESIOLOGIST WANTED PT TO FIND OUT IF HE NEEDS TO CONTINUE TO TAKE LASIX AND POTASSIUM - PTS WIFE REPORTS PT IS STILL TAKING MEDICATION AND HAS NOT HAD ANY SWELLING - BUT THEY NEED TO KNOW IF PATENT NEEDS TO CONTINUE THIS MEDICATION PRIOR TO THE SURGERY

## 2023-05-24 ENCOUNTER — OFFICE VISIT (OUTPATIENT)
Dept: CARDIOLOGY | Facility: CLINIC | Age: 68
End: 2023-05-24
Payer: MEDICARE

## 2023-05-24 VITALS
WEIGHT: 254.4 LBS | HEIGHT: 72 IN | DIASTOLIC BLOOD PRESSURE: 84 MMHG | BODY MASS INDEX: 34.46 KG/M2 | OXYGEN SATURATION: 98 % | HEART RATE: 74 BPM | SYSTOLIC BLOOD PRESSURE: 127 MMHG

## 2023-05-24 DIAGNOSIS — R60.0 LOWER EXTREMITY EDEMA: ICD-10-CM

## 2023-05-24 DIAGNOSIS — E78.00 PURE HYPERCHOLESTEROLEMIA: ICD-10-CM

## 2023-05-24 DIAGNOSIS — I10 ESSENTIAL HYPERTENSION: Primary | ICD-10-CM

## 2023-05-24 PROCEDURE — 3079F DIAST BP 80-89 MM HG: CPT | Performed by: PHYSICIAN ASSISTANT

## 2023-05-24 PROCEDURE — 3074F SYST BP LT 130 MM HG: CPT | Performed by: PHYSICIAN ASSISTANT

## 2023-05-24 PROCEDURE — 99204 OFFICE O/P NEW MOD 45 MIN: CPT | Performed by: PHYSICIAN ASSISTANT

## 2023-05-24 PROCEDURE — 93000 ELECTROCARDIOGRAM COMPLETE: CPT | Performed by: PHYSICIAN ASSISTANT

## 2023-05-24 RX ORDER — ATORVASTATIN CALCIUM 20 MG/1
TABLET, FILM COATED ORAL
COMMUNITY
Start: 2023-04-03

## 2023-05-24 RX ORDER — LORATADINE 10 MG/1
10 TABLET ORAL DAILY
COMMUNITY

## 2023-05-24 NOTE — PROGRESS NOTES
Problem list     Subjective   Baron Bradford is a 68 y.o. male     Chief Complaint   Patient presents with   • LAST SEEN 2019        HPI    Patient is a 68-year-old male who presents back to the office for evaluation.  He has been sometime since he has been seen through our office.    He recently had orthopedic surgeries.  Patient has been on Lasix therapy as well as potassium supplement apparently for lower extremity edema in the past.  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    He had echocardiogram in 2017 with normal systolic function with mild diastolic dysfunction.  No severe valve disease.    He feels well.  He has no chest pain or pressure.  He can be mildly dyspneic but otherwise can do well.  He describes he can walk up a flight of stairs.  He recently has had 2 hip surgeries and has done well.  He does not describe PND orthopnea.    He does not describe palpitating nor does complain of dysrhythmic symptoms.  He is stable otherwise.      Current Outpatient Medications on File Prior to Visit   Medication Sig Dispense Refill   • atorvastatin (LIPITOR) 20 MG tablet      • furosemide (LASIX) 20 MG tablet 1 tablet Daily.     • loratadine (CLARITIN) 10 MG tablet Take 1 tablet by mouth Daily.     • metFORMIN (GLUCOPHAGE) 500 MG tablet      • omeprazole (priLOSEC) 20 MG capsule 1 capsule Daily.     • potassium chloride (MICRO-K) 10 MEQ CR capsule 1 capsule Daily.     • VENTOLIN  (90 Base) MCG/ACT inhaler Inhale 2 puffs 4 (Four) Times a Day.     • ALPRAZolam (XANAX) 0.5 MG tablet 0.5 mg 2 (Two) Times a Day. (Patient not taking: Reported on 5/24/2023)     • meloxicam (MOBIC) 15 MG tablet TAKE ONE TABLET BY MOUTH DAILY (Patient not taking: Reported on 5/24/2023) 30 tablet 0   • naproxen (NAPROSYN) 500 MG tablet 500 mg 2 (Two) Times a Day. (Patient not taking: Reported on 5/24/2023)       No current facility-administered medications on file prior to  "visit.       Patient has no known allergies.    Past Medical History:   Diagnosis Date   • Anxiety    • Depression    • Edema    • Osteoarthritis        Social History     Socioeconomic History   • Marital status:    Tobacco Use   • Smoking status: Never   • Smokeless tobacco: Never   Substance and Sexual Activity   • Alcohol use: No   • Drug use: No   • Sexual activity: Defer       Family History   Problem Relation Age of Onset   • COPD Mother    • Heart disease Mother    • Hypertension Mother    • Diabetes Mother    • Heart disease Father    • COPD Father    • Hypertension Father    • Diabetes Father        Review of Systems   Constitutional: Negative for appetite change, chills and fever.   HENT: Negative.  Negative for ear discharge, hearing loss, nosebleeds, sinus pain, sneezing, tinnitus and voice change.    Eyes: Negative for pain, redness, itching and visual disturbance.   Respiratory: Negative for apnea, cough, choking, chest tightness, shortness of breath and wheezing.    Cardiovascular: Negative for chest pain, palpitations and leg swelling.   Gastrointestinal: Negative for blood in stool, constipation, diarrhea and nausea.   Genitourinary: Negative.  Negative for difficulty urinating.   Musculoskeletal: Negative for arthralgias, back pain and neck pain.   Skin: Negative for rash and wound.   Neurological: Negative for dizziness, weakness, light-headedness and numbness.   Psychiatric/Behavioral: Negative for sleep disturbance.       Objective   Vitals:    05/24/23 1323   BP: 127/84   Pulse: 74   SpO2: 98%   Weight: 115 kg (254 lb 6.4 oz)   Height: 182.9 cm (72.01\")      /84   Pulse 74   Ht 182.9 cm (72.01\")   Wt 115 kg (254 lb 6.4 oz)   SpO2 98%   BMI 34.50 kg/m²     Lab Results (most recent)     None          Physical Exam  Vitals and nursing note reviewed.   Constitutional:       General: He is not in acute distress.     Appearance: Normal appearance. He is well-developed.   HENT:      " Head: Normocephalic and atraumatic.   Eyes:      General: No scleral icterus.        Right eye: No discharge.         Left eye: No discharge.      Conjunctiva/sclera: Conjunctivae normal.   Neck:      Vascular: No carotid bruit.   Cardiovascular:      Rate and Rhythm: Normal rate and regular rhythm.      Heart sounds: Normal heart sounds. No murmur heard.    No friction rub. No gallop.   Pulmonary:      Effort: Pulmonary effort is normal. No respiratory distress.      Breath sounds: Normal breath sounds. No wheezing or rales.   Chest:      Chest wall: No tenderness.   Musculoskeletal:      Right lower leg: No edema.      Left lower leg: No edema.   Skin:     General: Skin is warm and dry.      Coloration: Skin is not pale.      Findings: No erythema or rash.   Neurological:      Mental Status: He is alert and oriented to person, place, and time.      Cranial Nerves: No cranial nerve deficit.   Psychiatric:         Behavior: Behavior normal.         Procedure     ECG 12 Lead    Date/Time: 5/24/2023 1:29 PM  Performed by: David Candelaria PA  Authorized by: David Candelaria PA   Comparison: compared with previous ECG from 8/6/2019  Comments: EKG demonstrates 71 bpm with no acute ST changes               Assessment & Plan     Problems Addressed this Visit        Cardiac and Vasculature    Essential hypertension - Primary    Pure hypercholesterolemia    Relevant Medications    atorvastatin (LIPITOR) 20 MG tablet       Symptoms and Signs    Lower extremity edema   Diagnoses       Codes Comments    Essential hypertension    -  Primary ICD-10-CM: I10  ICD-9-CM: 401.9     Lower extremity edema     ICD-10-CM: R60.0  ICD-9-CM: 782.3     Pure hypercholesterolemia     ICD-10-CM: E78.00  ICD-9-CM: 272.0         Recommendation  1.  Patient is a 68-year-old male who presents to the office for evaluation.  Patient has done well.  Patient can walk up a flight of stairs.  Patient has no chest pain and only very mild dyspnea.  He  recently had to surgeries.  He is doing well.  He is here for preoperative clearance for his knee surgery.  He is doing well.  He is considered acceptable risk.    2.  Patient with lower extremity edema apparently in the past and was placed on Lasix therapy.  However, patient has no edema today.  We did discuss that he can stop that medication  As long as he has no significant symptoms, he can stay off that medication at this time.    3.  Patient with dyslipidemia.  He is on statin therapy.  He follows with primary.  For now, we can see him back for follow-up in a year or sooner as symptoms discussed.  Follow-up with primary as scheduled.             Baron Bradford  reports that he has never smoked. He has never used smokeless tobacco..          Advance Care Planning   ACP discussion was declined by the patient. Patient does not have an advance directive, declines further assistance.       Electronically signed by:

## 2023-05-24 NOTE — LETTER
May 24, 2023       No Recipients    Patient: Baron Bradford   YOB: 1955   Date of Visit: 5/24/2023       Dear Shaun Daly MD    Baron Bradford was in my office today. Below is a copy of my note.    If you have questions, please do not hesitate to call me. I look forward to following Baron along with you.         Sincerely,        FLORNECE Mckeon        CC:   No Recipients    Problem list     Subjective    Baron Bradford is a 68 y.o. male     Chief Complaint   Patient presents with   • LAST SEEN 2019        HPI    Patient is a 68-year-old male who presents back to the office for evaluation.  He has been sometime since he has been seen through our office.    He recently had orthopedic surgeries.  Patient has been on Lasix therapy as well as potassium supplement apparently for lower extremity edema in the past.  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    He had echocardiogram in 2017 with normal systolic function with mild diastolic dysfunction.  No severe valve disease.    He feels well.  He has no chest pain or pressure.  He can be mildly dyspneic but otherwise can do well.  He describes he can walk up a flight of stairs.  He recently has had 2 hip surgeries and has done well.  He does not describe PND orthopnea.    He does not describe palpitating nor does complain of dysrhythmic symptoms.  He is stable otherwise.      Current Outpatient Medications on File Prior to Visit   Medication Sig Dispense Refill   • atorvastatin (LIPITOR) 20 MG tablet      • furosemide (LASIX) 20 MG tablet 1 tablet Daily.     • loratadine (CLARITIN) 10 MG tablet Take 1 tablet by mouth Daily.     • metFORMIN (GLUCOPHAGE) 500 MG tablet      • omeprazole (priLOSEC) 20 MG capsule 1 capsule Daily.     • potassium chloride (MICRO-K) 10 MEQ CR capsule 1 capsule Daily.     • VENTOLIN  (90 Base) MCG/ACT inhaler Inhale 2 puffs 4 (Four) Times a Day.     • ALPRAZolam  (XANAX) 0.5 MG tablet 0.5 mg 2 (Two) Times a Day. (Patient not taking: Reported on 5/24/2023)     • meloxicam (MOBIC) 15 MG tablet TAKE ONE TABLET BY MOUTH DAILY (Patient not taking: Reported on 5/24/2023) 30 tablet 0   • naproxen (NAPROSYN) 500 MG tablet 500 mg 2 (Two) Times a Day. (Patient not taking: Reported on 5/24/2023)       No current facility-administered medications on file prior to visit.       Patient has no known allergies.    Past Medical History:   Diagnosis Date   • Anxiety    • Depression    • Edema    • Osteoarthritis        Social History     Socioeconomic History   • Marital status:    Tobacco Use   • Smoking status: Never   • Smokeless tobacco: Never   Substance and Sexual Activity   • Alcohol use: No   • Drug use: No   • Sexual activity: Defer       Family History   Problem Relation Age of Onset   • COPD Mother    • Heart disease Mother    • Hypertension Mother    • Diabetes Mother    • Heart disease Father    • COPD Father    • Hypertension Father    • Diabetes Father        Review of Systems   Constitutional: Negative for appetite change, chills and fever.   HENT: Negative.  Negative for ear discharge, hearing loss, nosebleeds, sinus pain, sneezing, tinnitus and voice change.    Eyes: Negative for pain, redness, itching and visual disturbance.   Respiratory: Negative for apnea, cough, choking, chest tightness, shortness of breath and wheezing.    Cardiovascular: Negative for chest pain, palpitations and leg swelling.   Gastrointestinal: Negative for blood in stool, constipation, diarrhea and nausea.   Genitourinary: Negative.  Negative for difficulty urinating.   Musculoskeletal: Negative for arthralgias, back pain and neck pain.   Skin: Negative for rash and wound.   Neurological: Negative for dizziness, weakness, light-headedness and numbness.   Psychiatric/Behavioral: Negative for sleep disturbance.       Objective    Vitals:    05/24/23 1323   BP: 127/84   Pulse: 74   SpO2: 98%  "  Weight: 115 kg (254 lb 6.4 oz)   Height: 182.9 cm (72.01\")      /84   Pulse 74   Ht 182.9 cm (72.01\")   Wt 115 kg (254 lb 6.4 oz)   SpO2 98%   BMI 34.50 kg/m²     Lab Results (most recent)       None            Physical Exam  Vitals and nursing note reviewed.   Constitutional:       General: He is not in acute distress.     Appearance: Normal appearance. He is well-developed.   HENT:      Head: Normocephalic and atraumatic.   Eyes:      General: No scleral icterus.        Right eye: No discharge.         Left eye: No discharge.      Conjunctiva/sclera: Conjunctivae normal.   Neck:      Vascular: No carotid bruit.   Cardiovascular:      Rate and Rhythm: Normal rate and regular rhythm.      Heart sounds: Normal heart sounds. No murmur heard.    No friction rub. No gallop.   Pulmonary:      Effort: Pulmonary effort is normal. No respiratory distress.      Breath sounds: Normal breath sounds. No wheezing or rales.   Chest:      Chest wall: No tenderness.   Musculoskeletal:      Right lower leg: No edema.      Left lower leg: No edema.   Skin:     General: Skin is warm and dry.      Coloration: Skin is not pale.      Findings: No erythema or rash.   Neurological:      Mental Status: He is alert and oriented to person, place, and time.      Cranial Nerves: No cranial nerve deficit.   Psychiatric:         Behavior: Behavior normal.         Procedure      ECG 12 Lead    Date/Time: 5/24/2023 1:29 PM  Performed by: David Candelaria PA  Authorized by: David Candelaria PA   Comparison: compared with previous ECG from 8/6/2019  Comments: EKG demonstrates 71 bpm with no acute ST changes              Assessment & Plan     Problems Addressed this Visit          Cardiac and Vasculature    Essential hypertension - Primary    Pure hypercholesterolemia    Relevant Medications    atorvastatin (LIPITOR) 20 MG tablet       Symptoms and Signs    Lower extremity edema     Diagnoses         Codes Comments    Essential " hypertension    -  Primary ICD-10-CM: I10  ICD-9-CM: 401.9     Lower extremity edema     ICD-10-CM: R60.0  ICD-9-CM: 782.3     Pure hypercholesterolemia     ICD-10-CM: E78.00  ICD-9-CM: 272.0           Recommendation  1.  Patient is a 68-year-old male who presents to the office for evaluation.  Patient has done well.  Patient can walk up a flight of stairs.  Patient has no chest pain and only very mild dyspnea.  He recently had to surgeries.  He is doing well.  He is here for preoperative clearance for his knee surgery.  He is doing well.  He is considered acceptable risk.    2.  Patient with lower extremity edema apparently in the past and was placed on Lasix therapy.  However, patient has no edema today.  We did discuss that he can stop that medication  As long as he has no significant symptoms, he can stay off that medication at this time.    3.  Patient with dyslipidemia.  He is on statin therapy.  He follows with primary.  For now, we can see him back for follow-up in a year or sooner as symptoms discussed.  Follow-up with primary as scheduled.            Baron Bradford  reports that he has never smoked. He has never used smokeless tobacco..          Advance Care Planning   ACP discussion was declined by the patient. Patient does not have an advance directive, declines further assistance.      Electronically signed by:

## 2024-09-16 ENCOUNTER — OFFICE VISIT (OUTPATIENT)
Dept: CARDIOLOGY | Facility: CLINIC | Age: 69
End: 2024-09-16
Payer: MEDICARE

## 2024-09-16 VITALS
WEIGHT: 249 LBS | OXYGEN SATURATION: 98 % | SYSTOLIC BLOOD PRESSURE: 144 MMHG | DIASTOLIC BLOOD PRESSURE: 89 MMHG | HEART RATE: 80 BPM | BODY MASS INDEX: 33.76 KG/M2

## 2024-09-16 DIAGNOSIS — E78.00 PURE HYPERCHOLESTEROLEMIA: ICD-10-CM

## 2024-09-16 DIAGNOSIS — I10 ESSENTIAL HYPERTENSION: Primary | ICD-10-CM

## 2024-09-16 DIAGNOSIS — R60.9 EDEMA, UNSPECIFIED TYPE: ICD-10-CM

## 2024-09-16 PROCEDURE — 3079F DIAST BP 80-89 MM HG: CPT | Performed by: PHYSICIAN ASSISTANT

## 2024-09-16 PROCEDURE — 3077F SYST BP >= 140 MM HG: CPT | Performed by: PHYSICIAN ASSISTANT

## 2024-09-16 PROCEDURE — 93000 ELECTROCARDIOGRAM COMPLETE: CPT | Performed by: PHYSICIAN ASSISTANT

## 2024-09-16 PROCEDURE — 99213 OFFICE O/P EST LOW 20 MIN: CPT | Performed by: PHYSICIAN ASSISTANT
